# Patient Record
Sex: FEMALE | Race: WHITE
[De-identification: names, ages, dates, MRNs, and addresses within clinical notes are randomized per-mention and may not be internally consistent; named-entity substitution may affect disease eponyms.]

---

## 2018-04-17 ENCOUNTER — HOSPITAL ENCOUNTER (EMERGENCY)
Dept: HOSPITAL 17 - PHED | Age: 43
Discharge: HOME | End: 2018-04-17
Payer: SELF-PAY

## 2018-04-17 VITALS — HEIGHT: 62 IN | WEIGHT: 183.65 LBS | BODY MASS INDEX: 33.79 KG/M2

## 2018-04-17 VITALS
OXYGEN SATURATION: 99 % | HEART RATE: 84 BPM | SYSTOLIC BLOOD PRESSURE: 173 MMHG | RESPIRATION RATE: 16 BRPM | DIASTOLIC BLOOD PRESSURE: 70 MMHG | TEMPERATURE: 98.4 F

## 2018-04-17 DIAGNOSIS — Z16.11: ICD-10-CM

## 2018-04-17 DIAGNOSIS — B96.20: ICD-10-CM

## 2018-04-17 DIAGNOSIS — N30.90: Primary | ICD-10-CM

## 2018-04-17 DIAGNOSIS — I10: ICD-10-CM

## 2018-04-17 DIAGNOSIS — Z87.442: ICD-10-CM

## 2018-04-17 DIAGNOSIS — N20.0: ICD-10-CM

## 2018-04-17 LAB
BACTERIA #/AREA URNS HPF: (no result) /HPF
COLOR UR: YELLOW
GLUCOSE UR STRIP-MCNC: (no result) MG/DL
HGB UR QL STRIP: (no result)
KETONES UR STRIP-MCNC: (no result) MG/DL
NITRITE UR QL STRIP: (no result)
SP GR UR STRIP: 1.02 (ref 1–1.03)
SQUAMOUS #/AREA URNS HPF: (no result) /HPF (ref 0–5)
URINE LEUKOCYTE ESTERASE: (no result)
WHITE BLOOD CELL CLUMPS: (no result)

## 2018-04-17 PROCEDURE — 87077 CULTURE AEROBIC IDENTIFY: CPT

## 2018-04-17 PROCEDURE — 87186 SC STD MICRODIL/AGAR DIL: CPT

## 2018-04-17 PROCEDURE — 84703 CHORIONIC GONADOTROPIN ASSAY: CPT

## 2018-04-17 PROCEDURE — 81001 URINALYSIS AUTO W/SCOPE: CPT

## 2018-04-17 PROCEDURE — 87086 URINE CULTURE/COLONY COUNT: CPT

## 2018-04-17 PROCEDURE — 99283 EMERGENCY DEPT VISIT LOW MDM: CPT

## 2018-04-17 NOTE — PD
HPI


Chief Complaint:   Complaint


Time Seen by Provider:  11:15


Travel History


International Travel<30 days:  No


Contact w/Intl Traveler<30days:  No


Traveled to known affect area:  No





History of Present Illness


HPI


The patient was seen and examined in the presence of the nurse.  This patient 

complains of dysuria and urinary frequency.  She has some left-sided flank 

pain.  It radiates toward her abdomen.  Duration is 2 weeks.  Severity is 

moderate.  No alleviating factors.  No exacerbating factors.  She went to [facility] and had a workup including labs and urinalysis and CT of abdomen 

and pelvis.  She was found to have a 1.2 cm nonobstructive stone with no 

evidence of hydronephrosis.  She is working on getting a urologist follow-up.  

She took a course of Keflex but the urinary symptoms did not go away.





PFSH


Past Medical History


Anxiety:  Yes


Depression:  Yes


Cardiovascular Problems:  Yes (htn not on meds)


Diabetes:  No (borderline)


Diminished Hearing:  No


Kidney Stones:  Yes


Influenza Vaccination:  No


Pregnant?:  Not Pregnant


LMP:  4/15/18





Social History


Alcohol Use:  No


Tobacco Use:  Yes





Allergies-Medications


(Allergen,Severity, Reaction):  


Coded Allergies:  


     Sulfa (Sulfonamide Antibiotics) (Verified  Allergy, Severe, Anaphylaxis, 4/ 17/18)


Reported Meds & Prescriptions





Reported Meds & Active Scripts


Active


Cipro (Ciprofloxacin HCl) 500 Mg Tab 500 Mg PO BID


Tylenol-Codeine #3 (Acetaminophen-Codeine) 300-30 mg Tab 1 Tab PO Q6H PRN


Zofran (Ondansetron HCl) 4 Mg Tab 4 Mg PO Q6HR PRN


Reported


Zofran Odt (Ondansetron Odt) 4 Mg Tab 4 Mg SL Q12HR PRN


Latuda (Lurasidone) 60 Mg Tab 60 Mg PO DAILY








Review of Systems


General / Constitutional:  No: Fever


Eyes:  No: Visual changes


HENT:  No: Headaches


Cardiovascular:  No: Chest Pain or Discomfort


Respiratory:  No: Shortness of Breath


Gastrointestinal:  Positive: Nausea, No: Diarrhea


Genitourinary:  Positive: Flank Pain, No: Dysuria


Musculoskeletal:  No: Pain


Skin:  No Rash


Neurologic:  No: Weakness


Psychiatric:  No: Depression


Endocrine:  No: Polydipsia


Hematologic/Lymphatic:  No: Easy Bruising





Physical Exam


Narrative


GENERAL: Well-nourished, well-developed patient in no apparent distress.


SKIN: Focused skin assessment reveals no rash and nodules. Skin is Warm and dry.


HEAD: Atraumatic. Normocephalic. 


EYES: Pupils equal and round. No scleral icterus. No injection or drainage. 


ENT: No nasal bleeding or discharge.  Mucous membranes pink and moist.


NECK: Trachea midline. No JVD. 


CARDIOVASCULAR: Regular rate and rhythm.  No murmur appreciated.


RESPIRATORY: No accessory muscle use. Clear to auscultation. Breath sounds 

equal bilaterally. 


GASTROINTESTINAL: Abdomen soft, non-tender, nondistended. Hepatic and splenic 

margins not palpable. 


MUSCULOSKELETAL: No obvious deformities. No clubbing.  No cyanosis.  No edema. 


NEUROLOGICAL: Awake and alert. No obvious cranial nerve deficits.  Motor 

grossly within normal limits. Normal speech.


PSYCHIATRIC: Appropriate mood and affect; insight and judgment normal.





Data


Data


Last Documented VS





Vital Signs








  Date Time  Temp Pulse Resp B/P (MAP) Pulse Ox O2 Delivery O2 Flow Rate FiO2


 


4/17/18 10:30 98.4 84 16 173/70 (104) 99   








Orders





 Orders


Urinalysis - C+S If Indicated (4/17/18 10:36)


Ed Urine Pregnancytest Poc (4/17/18 10:36)


Urine Culture (4/17/18 10:35)





Labs





Laboratory Tests








Test


  4/17/18


10:35


 


Urine Collection Type CLEAN CATCH 


 


Urine Color YELLOW 


 


Urine Turbidity SL CLOUDY 


 


Urine pH 6.0 


 


Urine Specific Gravity 1.020 


 


Urine Protein NEG mg/dL 


 


Urine Glucose (UA) NEG mg/dL 


 


Urine Ketones NEG mg/dL 


 


Urine Occult Blood TRACE 


 


Urine Nitrite NEG 


 


Urine Bilirubin NEG 


 


Urine Urobilinogen 0.2 MG/DL 


 


Urine Leukocyte Esterase LARGE 


 


Urine RBC 10-14 /hpf 


 


Urine WBC 25-49 /hpf 


 


Urine WBC Clumps FEW 


 


Urine Squamous Epithelial


Cells 6-8 /hpf 


 


 


Urine Bacteria FEW /hpf 


 


Microscopic Urinalysis Comment


  CULTURE


INDICATED


 


Urine Collection Time 10:35 











[facility]


Medical Decision Making


Medical Screen Exam Complete:  Yes


Emergency Medical Condition:  Yes


Medical Record Reviewed:  Yes


Differential Diagnosis


Cystitis, pyelonephritis, kidney stone


Narrative Course


I have reviewed the patient's electronic medical record.  I reviewed her workup 

from the outside hospital.  I reviewed her CT scan which showed nonobstructive 

stone without hydronephrosis, no emergent findings on the scan





Urinalysis shows 25-50 white cells and will be cultured





I prescribed her a course of Cipro as well as some Tylenol 3 and some Zofran 

for symptom relief


She has a soft benign nontender abdomen vital signs normal


Does not look septic nor toxic.





We will follow-up with urology





Diagnosis





 Primary Impression:  


 Cystitis


 Additional Impression:  


 Kidney stone





***Additional Instructions:  


Follow-up with primary care and urology


The patient was warned about potential sedation for the medications they will 

receive on prescription.


***Med/Other Pt SpecificInfo:  Prescription(s) given


Scripts


Ciprofloxacin (Cipro) 500 Mg Tab


500 MG PO BID for Infection, #10 TAB 0 Refills


   Prov: Vishnu Kate MD         4/17/18 


Acetaminophen-Codeine (Tylenol-Codeine #3) 300-30 mg Tab


1 TAB PO Q6H Y for PAIN, #12 TAB 0 Refills


   Prov: Vishnu Kate MD         4/17/18 


Ondansetron (Zofran) 4 Mg Tab


4 MG PO Q6HR Y for NAUSEA OR VOMITING, #12 TAB 0 Refills


   Prov: Vishnu Kate MD         4/17/18


Disposition:  01 DISCHARGE HOME


Condition:  Stable











Vishnu Kate MD Apr 17, 2018 12:30